# Patient Record
Sex: FEMALE | Race: ASIAN | NOT HISPANIC OR LATINO | Employment: UNEMPLOYED | URBAN - METROPOLITAN AREA
[De-identification: names, ages, dates, MRNs, and addresses within clinical notes are randomized per-mention and may not be internally consistent; named-entity substitution may affect disease eponyms.]

---

## 2017-02-07 ENCOUNTER — COMMUNICATION - HEALTHEAST (OUTPATIENT)
Dept: ENDOCRINOLOGY | Facility: CLINIC | Age: 32
End: 2017-02-07

## 2017-02-07 DIAGNOSIS — E03.9 HYPOTHYROID: ICD-10-CM

## 2017-02-10 ENCOUNTER — AMBULATORY - HEALTHEAST (OUTPATIENT)
Dept: LAB | Facility: CLINIC | Age: 32
End: 2017-02-10

## 2017-02-10 DIAGNOSIS — E03.9 HYPOTHYROID: ICD-10-CM

## 2017-02-16 ENCOUNTER — OFFICE VISIT - HEALTHEAST (OUTPATIENT)
Dept: ENDOCRINOLOGY | Facility: CLINIC | Age: 32
End: 2017-02-16

## 2017-02-16 DIAGNOSIS — E03.9 HYPOTHYROID: ICD-10-CM

## 2017-03-03 ENCOUNTER — HOME CARE/HOSPICE - HEALTHEAST (OUTPATIENT)
Dept: HOME HEALTH SERVICES | Facility: HOME HEALTH | Age: 32
End: 2017-03-03

## 2017-03-03 ENCOUNTER — ANESTHESIA - HEALTHEAST (OUTPATIENT)
Dept: ADMINISTRATIVE | Facility: OTHER | Age: 32
End: 2017-03-03

## 2017-12-18 ENCOUNTER — OFFICE VISIT - HEALTHEAST (OUTPATIENT)
Dept: INTERNAL MEDICINE | Facility: CLINIC | Age: 32
End: 2017-12-18

## 2017-12-18 DIAGNOSIS — E03.9 HYPOTHYROID: ICD-10-CM

## 2017-12-18 DIAGNOSIS — Z00.00 ANNUAL PHYSICAL EXAM: ICD-10-CM

## 2017-12-18 DIAGNOSIS — R53.83 FATIGUE: ICD-10-CM

## 2017-12-18 ASSESSMENT — MIFFLIN-ST. JEOR: SCORE: 1424.78

## 2017-12-19 ENCOUNTER — COMMUNICATION - HEALTHEAST (OUTPATIENT)
Dept: INTERNAL MEDICINE | Facility: CLINIC | Age: 32
End: 2017-12-19

## 2017-12-21 ENCOUNTER — COMMUNICATION - HEALTHEAST (OUTPATIENT)
Dept: INTERNAL MEDICINE | Facility: CLINIC | Age: 32
End: 2017-12-21

## 2020-01-17 ENCOUNTER — OFFICE VISIT - HEALTHEAST (OUTPATIENT)
Dept: FAMILY MEDICINE | Facility: CLINIC | Age: 35
End: 2020-01-17

## 2020-01-17 DIAGNOSIS — L84 CORN OR CALLUS: ICD-10-CM

## 2020-01-17 DIAGNOSIS — S91.209A AVULSION OF TOENAIL, INITIAL ENCOUNTER: ICD-10-CM

## 2020-01-17 DIAGNOSIS — L60.0 INGROWN TOENAIL: ICD-10-CM

## 2020-08-27 ENCOUNTER — OFFICE VISIT - HEALTHEAST (OUTPATIENT)
Dept: FAMILY MEDICINE | Facility: CLINIC | Age: 35
End: 2020-08-27

## 2020-08-27 DIAGNOSIS — M54.59 MECHANICAL LOW BACK PAIN: ICD-10-CM

## 2020-08-27 DIAGNOSIS — R25.1 TREMOR OF RIGHT HAND: ICD-10-CM

## 2020-08-27 DIAGNOSIS — Z13.29 SCREENING FOR THYROID DISORDER: ICD-10-CM

## 2020-08-27 DIAGNOSIS — R53.83 FATIGUE, UNSPECIFIED TYPE: ICD-10-CM

## 2020-08-27 DIAGNOSIS — H54.7 DECREASED VISUAL ACUITY: ICD-10-CM

## 2020-08-27 LAB
BASOPHILS # BLD AUTO: 0 THOU/UL (ref 0–0.2)
BASOPHILS NFR BLD AUTO: 1 % (ref 0–2)
EOSINOPHIL # BLD AUTO: 0.3 THOU/UL (ref 0–0.4)
EOSINOPHIL NFR BLD AUTO: 5 % (ref 0–6)
ERYTHROCYTE [DISTWIDTH] IN BLOOD BY AUTOMATED COUNT: 11.7 % (ref 11–14.5)
HBA1C MFR BLD: 5.3 %
HCT VFR BLD AUTO: 38.9 % (ref 35–47)
HGB BLD-MCNC: 13.1 G/DL (ref 12–16)
LYMPHOCYTES # BLD AUTO: 2 THOU/UL (ref 0.8–4.4)
LYMPHOCYTES NFR BLD AUTO: 40 % (ref 20–40)
MCH RBC QN AUTO: 30 PG (ref 27–34)
MCHC RBC AUTO-ENTMCNC: 33.8 G/DL (ref 32–36)
MCV RBC AUTO: 89 FL (ref 80–100)
MONOCYTES # BLD AUTO: 0.3 THOU/UL (ref 0–0.9)
MONOCYTES NFR BLD AUTO: 6 % (ref 2–10)
NEUTROPHILS # BLD AUTO: 2.5 THOU/UL (ref 2–7.7)
NEUTROPHILS NFR BLD AUTO: 48 % (ref 50–70)
PLATELET # BLD AUTO: 203 THOU/UL (ref 140–440)
PMV BLD AUTO: 7.7 FL (ref 7–10)
RBC # BLD AUTO: 4.38 MILL/UL (ref 3.8–5.4)
TSH SERPL DL<=0.005 MIU/L-ACNC: 0.43 UIU/ML (ref 0.3–5)
WBC: 5.1 THOU/UL (ref 4–11)

## 2021-05-30 VITALS — BODY MASS INDEX: 33.3 KG/M2 | WEIGHT: 194 LBS

## 2021-05-31 VITALS — HEIGHT: 63 IN | WEIGHT: 171 LBS | BODY MASS INDEX: 30.3 KG/M2

## 2021-06-04 VITALS
WEIGHT: 147 LBS | RESPIRATION RATE: 12 BRPM | DIASTOLIC BLOOD PRESSURE: 54 MMHG | BODY MASS INDEX: 25.23 KG/M2 | SYSTOLIC BLOOD PRESSURE: 92 MMHG | HEART RATE: 68 BPM

## 2021-06-04 VITALS
HEART RATE: 69 BPM | OXYGEN SATURATION: 99 % | SYSTOLIC BLOOD PRESSURE: 104 MMHG | BODY MASS INDEX: 24.99 KG/M2 | WEIGHT: 145.6 LBS | DIASTOLIC BLOOD PRESSURE: 62 MMHG

## 2021-06-05 NOTE — PROGRESS NOTES
Assessment/Plan:        1. Avulsion of toenail, initial encounter  Exam findings were discussed and patient verbally agreed to having the toenail removed.    Procedure:  Patient was positioned supine, and the/great toe with continued BUN prepped with Betadine and locally anesthetized with 1% lidocaine.  The partially avulsed nail was removed with no additional complication.  Aftercare instructions given  Follow-up PRN    2. Corn or callus  Exam findings were discussed and patient verbally agreed to having the corn trimmed.    Procedure:  In the supine position, using a #15 blade chronic trim down to its very base and enucleated.  Symptoms improves following the procedure      At the conclusion of the encounter the plan of care, disposition and all questions were answered and reviewed, and the patient acknowledged understanding and was involved in the decision making regarding the overall care plan.           Subjective:    Patient ID:   Clifford Ward is a 34 y.o. female presenting with an Icelandic  with having an partially avulsed left great toenail due to the injury as her son stepped on her toe about 3 weeks ago.  She also notes to having a hard spot on the end of her toe which causes pain and pressure.  She has has a history of a recurrent toenail maltracking usually going to one side wondering if something can be done about it.      Review of Systems  Allergy: reviewed  General : negative  A complete 5 point review of systems was obtained and is negative other than what is stated in the HPI.       The following patient's history were reviewed and updated as appropriate:   She  has a past medical history of Disease of thyroid gland and Migraine..      Outpatient Encounter Medications as of 1/17/2020   Medication Sig Dispense Refill     acetaminophen (TYLENOL ORAL) Take by mouth.       multivitamin therapeutic tablet Take 1 tablet by mouth daily.       prenatal vitamin iron-folic acid 27mg-0.8mg  (PRENATAL S) 27 mg iron- 800 mcg Tab tablet Take 1 tablet by mouth daily.       No facility-administered encounter medications on file as of 1/17/2020.          Objective:   /62 (Patient Site: Right Arm, Patient Position: Sitting, Cuff Size: Adult Regular)   Pulse 69   Wt 145 lb 9.6 oz (66 kg)   SpO2 99%   BMI 24.99 kg/m        Physical Exam  General exam: No apparent distress and well-hydrated  Left foot: Partially avulsed great toenail with no acute bruising, swelling or infection.  There is a hard corn at the distal end of the great toe.

## 2021-06-08 NOTE — PROGRESS NOTES
Progress Note    Reason for Visit:  Chief Complaint     Thyroid Problem          Progress Note:    HPI:      this patient ws  onsultation at the [facility] gynecologist because of abnormal TSH.  I do not have these results.  But according to the patient have TSH was abnormal the patient is currently 36 weeks pregnant this is her third pregnancy.  She is 71 years old.    She came to the clinic accompanied by her  according to her  and her second pregnancy 9 years agothyroid was overactive and she was treated with oral medication to control her thyroid.    Right now the patient feels fatigued and tired but it is hard to safety if it is due to the pregnancy.  She is having occasional palpitation but otherwise she's euthyroid by symptoms.    She only takes prenatal thyroid exam is normal.    Repeat thyroid function showed TSH of 0.46, free T4 1 0.0, total T3 159.    She denies family history of thyroid disorders.    Component      Latest Ref Rng 7/26/2016 2/10/2017   Sodium      136 - 145 mmol/L 137    Potassium      3.5 - 5.0 mmol/L 4.0    CO2      22 - 31 mmol/L 23    Chloride      98 - 107 mmol/L 105    Anion Gap, Calculation      5 - 18 mmol/L 9    BUN      8 - 22 mg/dL 8    Creatinine      0.60 - 1.10 mg/dL 0.62    GFR MDRD Non Af Amer      >60 mL/min/1.73m2 >60    GFR MDRD Af Amer      >60 mL/min/1.73m2 >60    Glucose      70 - 125 mg/dL 76    Calcium      8.5 - 10.5 mg/dL 9.1    TSH      0.30 - 5.00 uIU/mL  0.46   Free T4      0.7 - 1.8 ng/dL  1.0   T3, Total      45 - 175 ng/dL  159         Review of Systems:    Nervous System: No headache, dizziness, fainting or memory loss. No tingling sensation of hand or feet.  Ears: No hearing loss or ringing in the ears  Eyes: No blurring of vision, redness, itching or dryness.  Nose: No nosebleed or loss of smell  Mouth: No mouth sores or loss of taste  Throat: No hoarseness or difficulty swallowing  Neck: No enlarged thyroid or lymph nodes.  Heart: No  chest pain, palpitation or irregular heartbeat. No swelling of hands or feet  Lungs: No shortness of breath, cough, night sweats, wheezing or hemoptysis.  Gastrointestinal: No nausea or vomiting, constipation or diarrhea.  No acid reflux, abdominal pain or blood in stools.  Kidney/Bladdr: No polyuria, polydipsia, nocturia or hematuria.  Genital/Sexual: No loss of libido  Skin: No rash, hair loss or hirsutism.  No abnormal striae  Muscles/Joints/Bones: No morning stiffness, muscle aches and pain or loss of height.    Current Medications:  Current Outpatient Prescriptions   Medication Sig     nitrofurantoin, macrocrystal-monohydrate, (MACROBID) 100 MG capsule Take 100 mg by mouth 2 (two) times a day.     prenatal vitamin iron-folic acid 27mg-0.8mg (PRENATAL S) 27 mg iron- 800 mcg Tab tablet Take 1 tablet by mouth daily.     pyridoxine, vitamin B6, (VITAMIN B-6) 50 MG tablet Take 50 mg by mouth daily.       Patients Active Problems:  Patient Active Problem List   Diagnosis     Tinea Versicolor     Adjustment Disorder With Depressed Mood     Pharyngitis     Headache     Impetigo     Hypothyroid       History:   reports that she has never smoked. She has never used smokeless tobacco. She reports that she does not drink alcohol or use illicit drugs.   reports that she has never smoked. She has never used smokeless tobacco. She reports that she does not drink alcohol or use illicit drugs.  History   Smoking Status     Never Smoker   Smokeless Tobacco     Never Used      reports that she has never smoked. She has never used smokeless tobacco. She reports that she does not drink alcohol or use illicit drugs.  History   Sexual Activity     Sexual activity: Yes     Partners: Male     Past Medical History:   Diagnosis Date     Disease of thyroid gland      Migraine      Family History   Problem Relation Age of Onset     Diabetes Mother      Hypertension Mother      Past Medical History:   Diagnosis Date     Disease of thyroid  gland      Migraine      No past surgical history on file.    Vitals   weight is 194 lb (88 kg). Her blood pressure is 126/74 and her pulse is 76.         Exam  General appearance: The patient looked well, not in acute distress.  Eyes: no evidence of thyroid eye disease.   Retinal exam: No evidence of diabetic retinopathy.  Mouth and Throat: Normal  Neck: No evidence of thyromegaly, enlarged lymph node or tenderness  Chest: Trachea is central. Chest is clear to auscultation and percussion. Breat sounds are normal.  Cardiovascular exam: JVP is not raised. Heart sounds are normal, no murmurs or rub  Peripheral pulses are palpable.   Abdomen: No masses or tenderness.    Back: No vertebral tenderness or kyphosis.  Extremities: No evidence of leg edema.   Skin: Normal to touch.  No abnormal striae  Neurologic exam:  Visual fields are intact by confrontation, grossly intact. No evidence of peripheral neuropathy.  Detailed foot exam normal.        Diagnosis:  No diagnosis found.    Orders:   No orders of the defined types were placed in this encounter.        Assessment and Plan:  Hyperthyroidism due to pregnancy I discussed about the vicinity of the disease was a patient.  I told about this is normal response due to the similarity of the structure between dialysis of unit of TSH and hCG.    Her thyroid function test is normal masses there is no need for her to take any medication.    We will check a thyroid function test again in 3 months.    Blood pressure 126/74 pulse 74.  I have not given to patient any further appointments.  But I would be happy to see her infant in the future if her thyroid function became abnormal.    I did spent 40 minutes with the patient more than 50% was spent on counseling and managing education.

## 2021-06-09 NOTE — ANESTHESIA POSTPROCEDURE EVALUATION
"Patient: Clifford Ward  * No procedures listed *  Anesthesia type: regional    Visit Vitals     /63 (Patient Position: Sitting)     Pulse (!) 102     Temp 36.6  C (97.8  F) (Oral)     Resp 16     Ht 5' 3\" (1.6 m)     Wt 197 lb (89.4 kg)     LMP 06/03/2016     SpO2 97%     Breastfeeding Unknown     BMI 34.9 kg/m2     CNS normal. No post dural puncture headache. No noted or reported complications of labor epidural.  "

## 2021-06-09 NOTE — ANESTHESIA PREPROCEDURE EVALUATION
Anesthesia Evaluation      Patient summary reviewed   No history of anesthetic complications     Airway   Mallampati: III  Neck ROM: full   Pulmonary - negative ROS and normal exam    breath sounds clear to auscultation                         Cardiovascular - negative ROS and normal exam  Exercise tolerance: good  Rhythm: regular  Rate: normal,         Neuro/Psych - negative ROS     Endo/Other    (+) hypothyroidism, obesity, pregnant     GI/Hepatic/Renal - negative ROS      Other findings: Gravid. Denies PIH, GDM or Preeclampsia.          Dental    (+) poor dentition and chipped                       Anesthesia Plan  Planned anesthetic: epidural  Labor epidural risks and benefits discussed with patient.  All questions answered.  Consent signed.  Patient wishes to proceed.  RN present.    ASA 2     Anesthetic plan and risks discussed with: patient and spouse  Anesthesia plan special considerations: increased risk of difficult airway,   Post-op plan: routine recovery

## 2021-06-09 NOTE — ANESTHESIA PROCEDURE NOTES
Epidural Block    Patient location during procedure: OB  Time Called: 3/3/2017 3:00 AM  Reason for Block:at surgeon's request and labor epidural  Staffing:  Performing  Anesthesiologist: NADIA BARRAZA  Preanesthetic Checklist  Completed: patient identified, risks, benefits, and alternatives discussed, timeout performed, consent obtained, at patient's request, airway assessed, oxygen available, suction available, emergency drugs available and hand hygiene performed  Procedure  Patient position: sitting  Prep: ChloraPrep  Patient monitoring: continuous pulse oximetry, heart rate and blood pressure  Approach: midline  Location: L2-L3  Injection technique: DARLYN air  Number of Attempts:1  Needle  Needle type: Tuohy   Needle gauge: 17 G     Catheter in Space: 5  Assessment  Sensory level: T10  No complications      Additional Notes:  No CSF.  No Heme.  Infusion connected after test dose negative.  No issues.  Patient tolerated well. Pump reviewed and started.  Patients vital signs stable.  No LAST.  RN was in room the whole time.

## 2021-06-10 NOTE — PROGRESS NOTES
Subjective     Clifford Ward is a 35 y.o. female who presents to clinic today for the following health issues:  Please note that today's visit is conducted with the assistance of an Setswana  via phone.     HPI   Vision difficulty - pt had recently been screened for vision at a driving test.  She did not pass, due to poor acuity and inability to recognize color yellow. She has not previously been told that she was color-blind.  She has not noticed a decrease in her visual acuity recently.  She has not had formal vision screening in the past.      Hx of gestational hypothyroidism with pregnancy in 2017. Did not require treatment during pregnancy. Has not had thyroid levels checked since that time.  Has noted increased fatigue and weight gain over the last several months. No changes in mood or bowel habits.     Low back pain intermittently -for the last few months, patient has noted episodes of low back pain lasting a couple of days before resolving without specific intervention.  She takes tylenol for pain  with significant relief.  Describes pain across the low back, but not with radiation into the legs.  She finds that it does not limit her activities. She notes no associated urinary symptoms. No injury. She is not regularly exercising/stretching.  Occasionally will note unrelated numbness and tingling in the legs/feet, though this is infrequent and does not limit her activity.     Right Hand tremor - right hand tremor noted intermittently, seems to be worsening, though patient does not notice it on a daily basis.  She is not sure of anything that seems to make it better or worse.  It currently is not limiting her activities.  She has noticed it most often when carrying a full glass of tea, when she will sometimes spill some of this due to the tremor.  She denies any associated hand weakness, numbness, or tingling.  She is not aware of any family history of similar tremors.  She has not appreciated  the tremor to be present when hand is at rest..      Patient Active Problem List   Diagnosis     Tinea Versicolor     Adjustment Disorder With Depressed Mood     Pharyngitis     Headache     Impetigo     Hypothyroid     History reviewed. No pertinent surgical history.    Social History     Tobacco Use     Smoking status: Never Smoker     Smokeless tobacco: Never Used   Substance Use Topics     Alcohol use: No     Family History   Problem Relation Age of Onset     Diabetes Mother      Hypertension Mother      Breast cancer Paternal Aunt 40         Current Outpatient Medications   Medication Sig Dispense Refill     acetaminophen (TYLENOL ORAL) Take by mouth.       aspirin/acetaminophen/caffeine (EXCEDRIN MIGRAINE ORAL) Take by mouth.       naphazoline/zinc sulf/glycerin (CLEAR EYES ITCHY EYE RELIEF OPHT) Apply to eye.       No current facility-administered medications for this visit.      No Known Allergies    Review of Systems   Negative except as noted above in the HPI.      Objective    BP 92/54   Pulse 68   Resp 12   Wt 147 lb (66.7 kg)   BMI 25.23 kg/m    Body mass index is 25.23 kg/m .  Physical Exam   Physical Examination: General appearance - alert, well appearing, and in no distress  Mental status - normal mood, behavior, speech, dress, motor activity, and thought processes  Eyes - PERRL, conjunctiva normal bilaterally.  Neck - supple, no significant adenopathy, thyroid exam: thyroid is normal in size without nodules or tenderness  Chest - clear to auscultation, no wheezes, rales or rhonchi, symmetric air entry  Heart - normal rate, regular rhythm, normal S1, S2, no murmurs, rubs, clicks or gallops  Back exam - pain at left low back with flexion beyond 60 degrees at L-spine, no pain noted with twisting, extension or lateral movement at L-spine.  Mild tenderness noted at the left lumbar paraspinal muscles, no vertebral tenderness noted, positive straight-leg raise on the left at 90 degrees, normal reflexes  and strength bilateral lower extremities, sensory exam intact bilateral lower extremities  Neurological - alert, oriented, normal speech, no focal findings or movement disorder noted, DTR's normal and symmetric in both the upper and lower extremities, motor and sensory grossly normal bilaterally, normal muscle tone, no hand tremors noted with either rest nor activity during today's exam, strength 5/5 in the upper and lower extremities bilaterally.  Extremities -no lower extremity swelling noted on today's exam.        Assessment & Plan     Problem List Items Addressed This Visit     None      Visit Diagnoses     Decreased visual acuity    -  Primary    Relevant Orders    Glycosylated Hemoglobin A1c (Completed)    Ambulatory referral to Optometry    Screening for thyroid disorder        Relevant Orders    Thyroid Stimulating Hormone (TSH) (Completed)    Fatigue, unspecified type        Relevant Orders    Thyroid Stimulating Hormone (TSH) (Completed)    HM1(CBC and Differential) (Completed)    HM1 (CBC with Diff) (Completed)    Tremor of right hand        Mechanical low back pain            Patient instructions:   Please schedule a formal vision exam with optometry.  One of our referral specialists should be phoning you within the next 2 days to assist in scheduling this visit.  I would recommend screening blood tests for type 2 diabetes, thyroid function, and complete blood counts to further evaluate the fatigue, occasional numbness and tingling that you experience in your legs as well as the presence of intermittent hand tremor.  For your chronic low back pain I would recommend that you initially attempt to go for walks daily (ideally of at least 20-30 minutes duration-once or twice daily) and perform low back stretching/strengthening exercises as noted below.  If your symptoms do not seem to be improving with these measures within the next 4-6 weeks, please contact me for consideration of a referral for formal  "physical therapy.  Please also contact me if your symptoms seem to be worsening or if you should experience new associated symptoms, as well.  The intermittent activity-related hand tremor that you described at today's visit, is consistent with a benign (essential) tremor.  At this point it does not sound to be severe enough to require specific treatment (medicine).  If it should be noted to occur at rest or if it should worsen significantly and begin to affect your ability to perform tasks, I would recommend a follow-up visit to discuss treatment options further.    BMI:   Estimated body mass index is 25.23 kg/m  as calculated from the following:    Height as of 9/16/19: 5' 4\" (1.626 m).    Weight as of this encounter: 147 lb (66.7 kg).     Patient is advised to return to clinic for reassessment if noting lack of response to recommended treatments, or if noting any new or worsening symptoms.      Jermaine Woodruff MD  [facility]      "

## 2021-06-14 NOTE — PROGRESS NOTES
Assessment/Plan:     1. Annual physical exam  2. Hypothyroid  3. Fatigue  - Thyroid Stimulating Hormone (TSH)  - T3, Total  - T4, Free  - Reviewed the importance of monitoring for changes in breast appearance such as nipple inversion, changes in breast tissue texture, non-healing wounds, or nipple discharge   - The following high BMI interventions were performed this visit: encouragement to exercise  - Follow up if feeling increasingly down/depressed       Subjective:     Clifford Ward is a 32 y.o. female who presents for an annual exam. She speaks some English, her  translates for her when necessary. She is currently breastfeeding. Denies use of contraception and is using abstinence for contraception and declines an alternative at this time.     During 2nd pregnancy 8 years ago she had thyroid issues. She had symptoms of fatigue, low activity level, and weight gain. Her  recalls that she was taking medication for this but is unsure of the name of the medication. According to a recent endocrinology note she had hyperthyroidism treated with oral medication.     H/o depression in the past treated 8 years ago when she was having treatment for her thyroid. She has occasional feelings of depression but declines additional treatment in regards to this. She attributes these feelings to being tired and having 3 children (ages 8 months, 8 years and 10 years)     The patient reports that there is not domestic violence in her life.     Healthy Habits:   Regular Exercise: No  Healthy Diet: Yes  Dental Visits Regularly: Yes  Sexually active: Yes      Immunization History   Administered Date(s) Administered     Tdap 2009, 2016     Immunization status: UTD, she declines an influenza vaccine     Gynecologic History  No LMP recorded.  Contraception: none  Last Pap: 6/15/2015 Results were: normal HPV testing: not done       OB History    Para Term  AB Living   4 3 3  1 3   SAB TAB  Ectopic Multiple Live Births   1   0 3      # Outcome Date GA Lbr [place]/2nd Weight Sex Delivery Anes PTL Lv   4 Term 03/03/17 39w0d 06:45 / 01:24 10 lb 9 oz (4.791 kg) M Vag-Spont EPI N FILEMON   3 SAB 12/17/15           2 Term 03/29/09    M    FILEMON   1 Term 04/10/07    M    FILEMON          Current Outpatient Prescriptions   Medication Sig Dispense Refill     multivitamin therapeutic tablet Take 1 tablet by mouth daily.       prenatal vitamin iron-folic acid 27mg-0.8mg (PRENATAL S) 27 mg iron- 800 mcg Tab tablet Take 1 tablet by mouth daily.       No current facility-administered medications for this visit.      Past Medical History:   Diagnosis Date     Disease of thyroid gland      Migraine      History reviewed. No pertinent surgical history.  Review of patient's allergies indicates no known allergies.  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     Social History Main Topics     Smoking status: Never Smoker     Smokeless tobacco: Never Used     Alcohol use No     Drug use: No     Sexual activity: Yes     Partners: Male     Other Topics Concern     Not on file     Social History Narrative       Review of Systems  General:  Negative except as noted above  Eyes: Negative except as noted above  Ears/Nose/Throat: Negative except as noted above  Cardiovascular: Negative except as noted above  Respiratory:  Negative except as noted above  Gastrointestinal:  Negative except as noted above  Musculoskeletal:  Negative except as noted above  Skin: Negative except as noted above  Neurologic: Negative except as noted above  Psychiatric: Negative except as noted above  Endocrine: Negative except as noted above  Heme/Lymphatic: Negative except as noted above  "  Allergic/Immunologic: Negative except as noted above      Objective:      Vitals:    12/18/17 1553   BP: 100/64   Pulse: (!) 58   Weight: 171 lb (77.6 kg)   Height: 5' 3\" (1.6 m)     Wt Readings from Last 3 Encounters:   12/18/17 171 lb (77.6 kg)   03/02/17 197 lb (89.4 kg)   02/16/17 194 lb (88 kg)     Body mass index is 30.29 kg/(m^2).     Physical Exam:  General Appearance: Alert, cooperative, no distress.  Head: Normocephalic, without obvious abnormality, atraumatic  Eyes: PERRL, conjunctiva/corneas clear, EOM's intact  Ears: Normal TM's and external ear canals, both ears  Throat: Lips, mucosa, and tongue normal  Neck: Supple, symmetrical, trachea midline, no adenopathy;  thyroid: not enlarged, symmetric, no tenderness/mass/nodules  Back: Symmetric, no curvature, ROM normal, no CVA tenderness  Lungs: Clear to auscultation bilaterally, respirations unlabored  Breasts: No breast masses, tenderness, asymmetry, or nipple discharge. Reviewed SBE   Heart: Regular rate and rhythm, S1 and S2 normal, no murmur, rub, or gallop  Abdomen: Soft, non-tender, bowel sounds active all four quadrants,  no masses, no organomegaly  Extremities: Extremities normal, atraumatic, no cyanosis or edema  Skin: Skin color, texture, turgor normal, no rashes or lesions  Lymph nodes: Cervical, supraclavicular, and axillary nodes normal  Neurologic: Normal  Psych: Normal affect.  Does not appear anxious or depressed.         "

## 2021-06-15 PROBLEM — E03.9 HYPOTHYROID: Status: ACTIVE | Noted: 2017-02-09

## 2021-06-18 NOTE — PATIENT INSTRUCTIONS - HE
Patient Instructions by Jermaine Woodruff MD at 8/27/2020  1:00 PM     Author: Jermaine Woodruff MD Service: -- Author Type: Physician    Filed: 8/30/2020  1:59 PM Encounter Date: 8/27/2020 Status: Addendum    : Jermaine Woodruff MD (Physician)    Related Notes: Original Note by Jermaine Woodruff MD (Physician) filed at 8/30/2020  1:53 PM       Please schedule a formal vision exam with optometry.  One of our referral specialists should be phoning you within the next 2 days to assist in scheduling this visit.  I would recommend screening blood tests for type 2 diabetes, thyroid function, and complete blood counts to further evaluate the occasional numbness and tingling that you experience in your legs as well as the presence of intermittent hand tremor.  For your chronic low back pain I would recommend that you initially attempt to go for walks daily (ideally of at least 20-30 minutes duration-once or twice daily) and perform low back stretching/strengthening exercises as noted below.  If your symptoms do not seem to be improving with these measures within the next 4-6 weeks, please contact me for consideration of a referral for formal physical therapy.  Please also contact me if your symptoms seem to be worsening or if you should experience new associated symptoms, as well.  The intermittent activity-related hand tremor that you described at today's visit, is consistent with a benign (essential) tremor.  At this point it does not sound to be severe enough to require specific treatment (medicine).  If it should be noted to occur at rest or if it should worsen significantly and begin to affect your ability to perform tasks, I would recommend a follow-up visit to discuss treatment options further.  Exercises to Strengthen Your Lower Back  Strong lower back and abdominal muscles work together to support your spine. The exercises below will help strengthen the lower back. It is important that you begin  exercising slowly and increase levels gradually.  Always begin any exercise program with stretching. If you feel pain while doing any of these exercises, stop and talk to your doctor about a more specific exercise program that better suits your condition.   Low back stretch  The point of stretching is to make you more flexible and increase your range of motion. Stretch only as much as you are able. Stretch slowly. Do not push your stretch to the limit. If at any point you feel pain while stretching, this is your (temporary) limit.    Lie on your back with your knees bent and both feet on the ground.    Slowly raise your left knee to your chest as you flatten your lower back against the floor. Hold for 5 seconds.    Relax and repeat the exercise with your right knee.    Do 10 of these exercises for each leg.    Repeat hugging both knees to your chest at the same time.  Building lower back strength  Start your exercise routine with 10 to 30 minutes a day, 1 to 3 times a day.  Initial exercises  Lying on your back:  1. Ankle pumps: Move your foot up and down, towards your head, and then away. Repeat 10 times with each foot.  2. Heel slides: Slowly bend your knee, drawing the heel of your foot towards you. Then slide your heel/foot from you, straightening your knee. Do not lift your foot off the floor (this is not a leg lift).  3. Abdominal contraction: Bend your knees and put your hands on your stomach. Tighten your stomach muscles. Hold for 5 seconds, then relax. Repeat 10 times.  4. Straight leg raise: Bend one leg at the knee and keep the other leg straight. Tighten your stomach muscles. Slowly lift your straight leg 6 to 12 inches off the floor and hold for up to 5 seconds. Repeat 10 times on each side.  Standin. Wall squats: Stand with your back against the wall. Move your feet about 12 inches away from the wall. Tighten your stomach muscles, and slowly bend your knees until they are at about a 45 degree  angle. Do not go down too far. Hold about 5 seconds. Then slowly return to your starting position. Repeat 10 times.  2. Heel raises: Stand facing the wall. Slowly raise the heels of your feet up and down, while keeping your toes on the floor. If you have trouble balancing, you can touch the wall with your hands. Repeat 10 times.  More advanced exercises  When you feel comfortable enough, try these exercises.  1. Kneeling lumbar extension: Begin on your hands and knees. At the same time, raise and straighten your right arm and left leg until they are parallel to the ground. Hold for 2 seconds and come back slowly to a starting position. Repeat with left arm and right leg, alternating 10 times.  2. Prone lumbar extension: Lie face down, arms extended overhead, palms on the floor. At the same time, raise your right arm and left leg as high as comfortably possible. Hold for 10 seconds and slowly return to start. Repeat with left arm and right leg, alternating 10 times. Gradually build up to 20 times. (Advanced: Repeat this exercise raising both arms and both legs a few inches off the floor at the same time. Hold for 5 seconds and release.)  3. Pelvic tilt: Lie on the floor on your back with your knees bent at 90 degrees. Your feet should be flat on the floor. Inhale, exhale, then slowly contract your abdominal muscles bringing your navel toward your spine. Let your pelvis rock back until your lower back is flat on the floor. Hold for 10 seconds while breathing smoothly.  4. Abdominal crunch: Perform a pelvic tilt (above) flattening your lower back against the floor. Holding the tension in your abdominal muscles, take another breath and raise your shoulder blades off the ground (this is not a full sit-up). Keep your head in line with your body (dont bend your neck forward). Hold for 2 seconds, then slowly lower.  Date Last Reviewed: 6/1/2016 2000-2017 [employer]. [address] [facility], [place], [place]  [facility]. All rights reserved. This information is not intended as a substitute for professional medical care. Always follow your healthcare professional's instructions.           Back Exercises: Back Release  Do this exercise on your hands and knees. Keep your knees under your hips and your hands under your shoulders.        Relax your abdominal and buttocks muscles, lift your head, and let your back sag. Be sure to keep your weight evenly distributed. Dont sit back on your hips.     Hold for 5 seconds.    Return to starting position.    Tuck your head and lift (arch) your back.    Hold for 5 seconds    Return to starting position.    Repeat 5 times.  Date Last Reviewed: 3/1/2018    3579-2309 [employer]. [address] [place] [address]. All rights reserved. This information is not intended as a substitute for professional medical care. Always follow your healthcare professional's instructions.           Lumbar Extension (Flexibility)    1. Lie face down on your belly, forehead on the floor. You can lie on a mat or towel.  2. Bend your arms next to your body and lift your upper body up onto your forearms. Your palms and forearms should be flat on the floor. Keep your belly and hips on the floor.  3. Hold your upper body up with your forearms for 20 seconds. Then slowly lower back down to the floor.  4. Repeat 2 times, or as instructed.  Date Last Reviewed: 3/10/2016    0379-6002 [employer]. [address] [place] [address]. All rights reserved. This information is not intended as a substitute for professional medical care. Always follow your healthcare professional's instructions.           Self-Care for Low Back Pain    Most people have low back pain now and then. In many cases, it isnt serious and self-care can help. Sometimes low back pain can be a sign of a bigger problem. Call your healthcare provider if your pain returns often or gets worse over time. For the  long-term care of your back, get regular exercise, lose any excess weight and learn good posture.  Take a short rest  Lying down during the day may be beneficial for short periods of time if severe pain increases with sitting or standing. Long-term bed rest could be detrimental.  Reduce pain and swelling  Cold reduces swelling. Both cold and heat can reduce pain. Protect your skin by placing a towel between your body and the ice or heat source.    For the first few days, apply an ice pack for 15 to 20 minutes .    After the first few days, try heat for 15 minutes at a time to ease pain. Never sleep on a heating pad.    Over-the-counter medicine can help control pain and swelling. Try aspirin or ibuprofen.  Exercise  Exercise can help your back heal. It also helps your back get stronger and more flexible, preventing any reinjury. Ask your healthcare provider about specific exercises for your back.      Use good posture to avoid reinjury    When moving, bend at the hips and knees. Dont bend at the waist or twist around.    When lifting, keep the object close to your body. Dont try to lift more than you can handle.    When sitting, keep your lower back supported. Use a rolled-up towel as needed.  Seek immediate medical care if:    Youre unable to stand or walk.    You have a temperature over 100.4 F (38.0 C)    You have frequent, painful, or bloody urination.    You have severe abdominal pain.    You have a sharp, stabbing pain.    Your pain is constant.    You have pain or numbness in your leg.    You feel pain in a new area of your back.    You notice that the pain isnt decreasing after more than a week.   Date Last Reviewed: 9/29/2015 2000-2017 [employer]. [address] [place] [address]. All rights reserved. This information is not intended as a substitute for professional medical care. Always follow your healthcare professional's instructions.

## 2021-06-20 ENCOUNTER — HEALTH MAINTENANCE LETTER (OUTPATIENT)
Age: 36
End: 2021-06-20

## 2021-07-14 PROBLEM — Z34.90 PREGNANT: Status: RESOLVED | Noted: 2017-03-02 | Resolved: 2017-03-03

## 2021-10-11 ENCOUNTER — HEALTH MAINTENANCE LETTER (OUTPATIENT)
Age: 36
End: 2021-10-11

## 2021-12-14 ENCOUNTER — OFFICE VISIT (OUTPATIENT)
Dept: FAMILY MEDICINE | Facility: CLINIC | Age: 36
End: 2021-12-14
Payer: COMMERCIAL

## 2021-12-14 VITALS
DIASTOLIC BLOOD PRESSURE: 67 MMHG | HEART RATE: 86 BPM | WEIGHT: 160.6 LBS | OXYGEN SATURATION: 98 % | BODY MASS INDEX: 27.57 KG/M2 | TEMPERATURE: 99.2 F | SYSTOLIC BLOOD PRESSURE: 100 MMHG

## 2021-12-14 DIAGNOSIS — Z20.822 SUSPECTED 2019 NOVEL CORONAVIRUS INFECTION: Primary | ICD-10-CM

## 2021-12-14 DIAGNOSIS — S46.812A TRAPEZIUS STRAIN, LEFT, INITIAL ENCOUNTER: ICD-10-CM

## 2021-12-14 PROCEDURE — U0005 INFEC AGEN DETEC AMPLI PROBE: HCPCS | Performed by: PHYSICIAN ASSISTANT

## 2021-12-14 PROCEDURE — U0003 INFECTIOUS AGENT DETECTION BY NUCLEIC ACID (DNA OR RNA); SEVERE ACUTE RESPIRATORY SYNDROME CORONAVIRUS 2 (SARS-COV-2) (CORONAVIRUS DISEASE [COVID-19]), AMPLIFIED PROBE TECHNIQUE, MAKING USE OF HIGH THROUGHPUT TECHNOLOGIES AS DESCRIBED BY CMS-2020-01-R: HCPCS | Performed by: PHYSICIAN ASSISTANT

## 2021-12-14 PROCEDURE — 99214 OFFICE O/P EST MOD 30 MIN: CPT | Performed by: PHYSICIAN ASSISTANT

## 2021-12-14 NOTE — LETTER
December 15, 2021      Clifford Ward  [address] [place] [address]        Dear ,    We are writing to inform you of your test results.    {results letter list:[id number]}    Resulted Orders   Symptomatic; Yes COVID-19 Virus (Coronavirus) by PCR Nose   Result Value Ref Range    SARS CoV2 PCR Positive (A) Negative, Testing sent to reference lab. Results will be returned via unsolicited result      Comment:      POSITIVE: SARS-CoV-2 (COVID-19) RNA detected, presumed positive.    Narrative    Testing was performed using the [employer] SARS-CoV-2 Assay on the  [employer] Instrument System. Additional information about this  Emergency Use Authorization (EUA) assay can be found via the Lab  Guide. This test should be ordered for the detection of SARS-CoV-2 in  individuals who meet SARS-CoV-2 clinical and/or epidemiological  criteria. Test performance is unknown in asymptomatic patients. This  test is for in vitro diagnostic use under the FDA EUA for  laboratories certified under CLIA to perform high complexity testing.  This test has not been FDA cleared or approved. A negative result  does not rule out the presence of PCR inhibitors in the specimen or  target RNA in concentration below the limit of detection for the  assay. The possibility of a false negative should be considered if  the patient's recent exposure or clinical presentation suggests  COVID-19. This test was validated by the [facility]. This laboratory is certified under  the Clinical Laboratory Improvement Amendments of 1988 (CLIA-88) as  qualified to perform high complexity laboratory testing.       If you have any questions or concerns, please call the clinic at the number listed above.       Sincerely,      Suni Raymond PA-C

## 2021-12-15 ENCOUNTER — TELEPHONE (OUTPATIENT)
Dept: FAMILY MEDICINE | Facility: CLINIC | Age: 36
End: 2021-12-15
Payer: COMMERCIAL

## 2021-12-15 LAB — SARS-COV-2 RNA RESP QL NAA+PROBE: POSITIVE

## 2021-12-15 NOTE — PATIENT INSTRUCTIONS
Viral URI:  Patient was educated on the natural course of viral illness. High suspicion for COVID as spouse tested positive. COVID PCR is pending.  Conservative measures discussed including increased fluids, nasal saline irrigation (neti pot), warm steamy shower, salt water gargles, cough suppressants, expectorants (Mucinex), and analgesics (Tylenol and/or Ibuprofen). See your primary care provider if symptoms worsen or do not improve in 7 days. Seek emergency care if you develop fever over 104 or shortness of breath.     Left trapezius strain:  Patient was educated on the natural course of muscular strain. Conservative measures discussed including rest, heat, massage, and over-the-counter analgesics (Tylenol or Ibuprofen) as needed. See your primary care provider if symptoms worsen or do not improve in 7 days. Seek emergency care if you develop severe pain/swelling, inability to move extremity, skin paleness, or weakness.

## 2021-12-15 NOTE — PROGRESS NOTES
URGENT CARE VISIT:    SUBJECTIVE:   Clifford Ward is a 36 year old female presenting with a chief complaint of stuffy nose, cough - non-productive, headache, and sore throat. Onset was 2 day(s) ago. She denies the following symptoms: shortness of breath, vomiting and diarrhea. Course of illness is same. Treatment measures tried include Tylenol with no relief of symptoms. Predisposing factors include spouse tested positive for COVID.    She is also here for left shoulder and back pain since yesterday. Pain is sharp and intermittent. Pain worsens with movement and improves with rest. Tried Tylenol with no relief of symptoms. Symptoms are stable. No known injury.    PMH:   Past Medical History:   Diagnosis Date     Disease of thyroid gland      Migraine      Allergies: Patient has no known allergies.   Medications:   Current Outpatient Medications   Medication Sig Dispense Refill     acetaminophen (TYLENOL ORAL) [ACETAMINOPHEN (TYLENOL ORAL)] Take by mouth.       aspirin/acetaminophen/caffeine (EXCEDRIN MIGRAINE ORAL) [ASPIRIN/ACETAMINOPHEN/CAFFEINE (EXCEDRIN MIGRAINE ORAL)] Take by mouth.       Social History:   Social History     Tobacco Use     Smoking status: Never Smoker     Smokeless tobacco: Never Used   Substance Use Topics     Alcohol use: No       ROS:  General: negative  Skin: negative  Eyes: negative  Ears/Nose/Throat: nasal congestion  Respiratory: Cough  Cardiovascular: negative  Gastrointestinal: negative  Genitourinary: negative  Musculoskeletal: back pain and left shoulder pain  Neurologic: negative      OBJECTIVE:  /67 (BP Location: Right arm, Patient Position: Sitting, Cuff Size: Adult Regular)   Pulse 86   Temp 99.2  F (37.3  C) (Tympanic)   Wt 72.8 kg (160 lb 9.6 oz)   SpO2 98%   BMI 27.57 kg/m    GENERAL APPEARANCE: healthy, alert and no distress  EYES: EOMI,  PERRL, conjunctiva clear  HENT: ear canals and TM's normal.  Nose and mouth without ulcers, erythema or lesions  NECK:  supple, nontender, no lymphadenopathy  RESP: lungs clear to auscultation - no rales, rhonchi or wheezes  CV: regular rates and rhythm, normal S1 S2, no murmur noted  MS: moderate left trapezius TTP and over shoulder blade. FROM. Pain with shoulder abduction. No spinal TTP.  NEURO: Normal strength and tone, sensory exam grossly normal,  normal speech and mentation  SKIN: no suspicious lesions or rashes      ASSESSMENT:    ICD-10-CM    1. Suspected 2019 novel coronavirus infection  Z20.822 Symptomatic; Yes COVID-19 Virus (Coronavirus) by PCR     Symptomatic; Yes COVID-19 Virus (Coronavirus) by PCR Nose   2. Trapezius strain, left, initial encounter  S46.812A        PLAN:  Patient Instructions   Viral URI:  Patient was educated on the natural course of viral illness. High suspicion for COVID as spouse tested positive. COVID PCR is pending.  Conservative measures discussed including increased fluids, nasal saline irrigation (neti pot), warm steamy shower, salt water gargles, cough suppressants, expectorants (Mucinex), and analgesics (Tylenol and/or Ibuprofen). See your primary care provider if symptoms worsen or do not improve in 7 days. Seek emergency care if you develop fever over 104 or shortness of breath.     Left trapezius strain:  Patient was educated on the natural course of muscular strain. Conservative measures discussed including rest, heat, massage, and over-the-counter analgesics (Tylenol or Ibuprofen) as needed. See your primary care provider if symptoms worsen or do not improve in 7 days. Seek emergency care if you develop severe pain/swelling, inability to move extremity, skin paleness, or weakness.     Patient verbalized understanding and is agreeable to plan. The patient was discharged ambulatory and in stable condition.    Suni Raymond PA-C ....................  12/14/2021   6:41 PM

## 2021-12-16 NOTE — TELEPHONE ENCOUNTER
"-Coronavirus (COVID-19) Notification    Caller Name (Patient, parent, daughter/son, grandparent, etc)  Patient    Reason for call  Notify of Positive Coronavirus (COVID-19) lab results, assess symptoms,  review  [facility] recommendations    Lab Result    Lab test:  2019-nCoV rRt-PCR or SARS-CoV-2 PCR    Oropharyngeal AND/OR nasopharyngeal swabs is POSITIVE for 2019-nCoV RNA/SARS-COV-2 PCR (COVID-19 virus)    RN Recommendations/Instructions per [facility] Coronavirus COVID-19 recommendations    Brief introduction script  Introduce self then review script:  \"I am calling on behalf of [employer].  We were notified that your Coronavirus test (COVID-19) for was POSITIVE for the virus.  I have some information to relay to you but first I wanted to mention that the [place] Dept of Health will be contacting you shortly [it's possible MD already called Patient] to talk to you more about how you are feeling and other people you have had contact with who might now also have the virus.  Also,  [facility] is Partnering with the [facility] for Covid-19 research, you may be contacted directly by research staff.\"    Assessment (Inquire about Patient's current symptoms)   Assessment   Current Symptoms at time of phone call: (if no symptoms, document No symptoms] Runny nose,   Symptoms onset (if applicable) 12/11/2021     If at time of call, Patients symptoms hare worsened, the Patient should contact 911 or have someone drive them to Emergency Dept promptly:      If Patient calling 911, inform 911 personal that you have tested positive for the Coronavirus (COVID-19).  Place mask on and await 911 to arrive.    If Emergency Dept, If possible, please have another adult drive you to the Emergency Dept but you need to wear mask when in contact with other people.      Monoclonal Antibody Administration    You may be eligible to receive a new treatment with a monoclonal antibody for preventing hospitalization in " "patients at high risk for complications from COVID-19.   This medication is still experimental and available on a limited basis; it is given through an IV and must be given at an infusion center. Please note that not all people who are eligible will receive the medication since it is in limited supply.      Are you interested in being considered for this medication?  No.   Does the patient fit the criteria: No    If patient qualifies based on above criteria:  \"You will be contacted if you are selected to receive this treatment in the next 1-2 business days.   This is time sensitive and if you are not selected in the next 1-2 business days, you will not receive the medication.  If you do not receive a call to schedule, you have not been selected.\"      Review information with Patient    Your result was positive. This means you have COVID-19 (coronavirus).  We have sent you a letter that reviews the information that I'll be reviewing with you now.    How can I protect others?    If you have symptoms: stay home and away from others (self-isolate) until:    You've had no fever--and no medicine that reduces fever--for 1 full day (24 hours). And       Your other symptoms have gotten better. For example, your cough or breathing has improved. And     At least 10 days have passed since your symptoms started. (If you've been told by a doctor that you have a weak immune system, wait 20 days.)     If you don't have symptoms: Stay home and away from others (self-isolate) until at least 10 days have passed since your first positive COVID-19 test. (Date test collected)    During this time:    Stay in your own room, including for meals. Use your own bathroom if you can.    Stay away from others in your home. No hugging, kissing or shaking hands. No visitors.     Don't go to work, school or anywhere else.     Clean  high touch  surfaces often (doorknobs, counters, handles, etc.). Use a household cleaning spray or wipes. You'll find " a full list on the EPA website at www.epa.gov/pesticide-registration/list-n-disinfectants-use-against-sars-cov-2.     Cover your mouth and nose with a mask, tissue or other face covering to avoid spreading germs.    Wash your hands and face often with soap and water.    Make a list of people you have been in close contact with recently, even if either of you wore a face covering.   ; Start your list from 2 days before you became ill or had a positive test.  ; Include anyone that was within 6 feet of you for a cumulative total of 15 minutes or more in 24 hours. (Example: if you sat next to Srinath for 5 minutes in the morning and 10 minutes in the afternoon, then you were in close contact for 15 minutes total that day. Srinath would be added to your list.)    A public health worker will call or text you. It is important that you answer. They will ask you questions about possible exposures to COVID-19, such as people you have been in direct contact with and places you have visited.    Tell the people on your list that you have COVID-19; they should stay away from others for 14 days starting from the last time they were in contact with you (unless you are told something different from a public health worker).     Caregivers in these groups are at risk for severe illness due to COVID-19:  o People 65 years and older  o People who live in a nursing home or long-term care facility  o People with chronic disease (lung, heart, cancer, diabetes, kidney, liver, immunologic)  o People who have a weakened immune system, including those who:  - Are in cancer treatment  - Take medicine that weakens the immune system, such as corticosteroids  - Had a bone marrow or organ transplant  - Have an immune deficiency  - Have poorly controlled HIV or AIDS  - Are obese (body mass index of 40 or higher)  - Smoke regularly    Caregivers should wear gloves while washing dishes, handling laundry and cleaning bedrooms and bathrooms.    Wash and dry  laundry with special caution. Don't shake dirty laundry, and use the warmest water setting you can.    If you have a weakened immune system, ask your doctor about other actions you should take.    For more tips, go to www.cdc.gov/coronavirus/2019-ncov/downloads/10Things.pdf.    You should not go back to work until you meet the guidelines above for ending your home isolation. You don't need to be retested for COVID-19 before going back to work--studies show that you won't spread the virus if it's been at least 10 days since your symptoms started (or 20 days, if you have a weak immune system).    Employers: This document serves as formal notice of your employee's medical guidelines for going back to work. They must meet the above guidelines before going back to work in person.    How can I take care of myself?    1. Get lots of rest. Drink extra fluids (unless a doctor has told you not to).    2. Take Tylenol (acetaminophen) for fever or pain. If you have liver or kidney problems, ask your family doctor if it's okay to take Tylenol.     Take either:     650 mg (two 325 mg pills) every 4 to 6 hours, or     1,000 mg (two 500 mg pills) every 8 hours as needed.     Note: Don't take more than 3,000 mg in one day. Acetaminophen is found in many medicines (both prescribed and over-the-counter medicines). Read all labels to be sure you don't take too much.    For children, check the Tylenol bottle for the right dose (based on their age or weight).    3. If you have other health problems (like cancer, heart failure, an organ transplant or severe kidney disease): Call your specialty clinic if you don't feel better in the next 2 days.    4. Know when to call 911: Emergency warning signs include:    Trouble breathing or shortness of breath    Pain or pressure in the chest that doesn't go away    Feeling confused like you haven't felt before, or not being able to wake up    Bluish-colored lips or face    5. Sign up for [facility]  [place]eah. We know it's scary to hear that you have COVID-19. We want to track your symptoms to make sure you're okay over the next 2 weeks. Please look for an email from Kati [employer]--this is a free, online program that we'll use to keep in touch. To sign up, follow the link in the email. Learn more at [email or web address].    Where can I get more information?    [facility]: [email or web address]    Coronavirus Basics: www.health.[email or web address].[place]./diseases/coronavirus/basics.html    What to Do If You're Sick: www.cdc.gov/coronavirus/2019-ncov/about/steps-when-sick.html    Ending Home Isolation: www.cdc.gov/coronavirus/2019-ncov/hcp/disposition-in-home-patients.html     Caring for Someone with COVID-19: www.cdc.gov/coronavirus/2019-ncov/if-you-are-sick/care-for-someone.html     [facility] clinical trials (COVID-19 research studies): clinicalaffairs.[facility]-clinical-trials     A Positive COVID-19 letter will be sent via [employer] or the mail. (Exception, no letters sent to Presurgerical/Preprocedure Patients)    Patient declines full quarantine instructions as  is covid19 positive and has been instructed.  Anne Galeana LPN

## 2022-03-01 ENCOUNTER — HOSPITAL ENCOUNTER (EMERGENCY)
Facility: HOSPITAL | Age: 37
Discharge: HOME | End: 2022-03-02
Attending: EMERGENCY MEDICINE | Admitting: EMERGENCY MEDICINE
Payer: COMMERCIAL

## 2022-03-01 DIAGNOSIS — T78.40XA ALLERGIC REACTION, INITIAL ENCOUNTER: ICD-10-CM

## 2022-03-01 LAB
HCG UR QL: NEGATIVE
INTERNAL QC OK POCT: NORMAL
POCT KIT EXPIRATION DATE: NORMAL
POCT KIT LOT NUMBER: NORMAL

## 2022-03-01 PROCEDURE — 250N000013 HC RX MED GY IP 250 OP 250 PS 637: Performed by: PHYSICIAN ASSISTANT

## 2022-03-01 PROCEDURE — 93005 ELECTROCARDIOGRAM TRACING: CPT | Performed by: PHYSICIAN ASSISTANT

## 2022-03-01 PROCEDURE — 99285 EMERGENCY DEPT VISIT HI MDM: CPT | Mod: 25

## 2022-03-01 PROCEDURE — 81025 URINE PREGNANCY TEST: CPT | Performed by: PHYSICIAN ASSISTANT

## 2022-03-01 RX ORDER — DIPHENHYDRAMINE HCL 50 MG
50 CAPSULE ORAL ONCE
Status: COMPLETED | OUTPATIENT
Start: 2022-03-01 | End: 2022-03-01

## 2022-03-01 RX ADMIN — DIPHENHYDRAMINE HCL 50 MG: 50 CAPSULE ORAL at 23:31

## 2022-03-01 ASSESSMENT — ENCOUNTER SYMPTOMS
COUGH: 0
DIARRHEA: 0
HEADACHES: 0
DYSURIA: 0
MYALGIAS: 0
FREQUENCY: 0
ABDOMINAL PAIN: 0
CHILLS: 0
SORE THROAT: 0
FEVER: 0
SHORTNESS OF BREATH: 0
NERVOUS/ANXIOUS: 0
HEMATURIA: 0
VOMITING: 0
LIGHT-HEADEDNESS: 1
NAUSEA: 0

## 2022-03-02 ENCOUNTER — APPOINTMENT (OUTPATIENT)
Dept: RADIOLOGY | Facility: HOSPITAL | Age: 37
End: 2022-03-02
Payer: COMMERCIAL

## 2022-03-02 VITALS
HEART RATE: 72 BPM | TEMPERATURE: 97.7 F | WEIGHT: 163 LBS | SYSTOLIC BLOOD PRESSURE: 97 MMHG | BODY MASS INDEX: 27.98 KG/M2 | OXYGEN SATURATION: 98 % | DIASTOLIC BLOOD PRESSURE: 64 MMHG | RESPIRATION RATE: 25 BRPM

## 2022-03-02 LAB
ATRIAL RATE - MUSE: 74 BPM
DIASTOLIC BLOOD PRESSURE - MUSE: NORMAL MMHG
INTERPRETATION ECG - MUSE: NORMAL
P AXIS - MUSE: 65 DEGREES
PR INTERVAL - MUSE: 142 MS
QRS DURATION - MUSE: 80 MS
QT - MUSE: 376 MS
QTC - MUSE: 417 MS
R AXIS - MUSE: 55 DEGREES
SYSTOLIC BLOOD PRESSURE - MUSE: NORMAL MMHG
T AXIS - MUSE: 54 DEGREES
VENTRICULAR RATE- MUSE: 74 BPM

## 2022-03-02 PROCEDURE — 250N000013 HC RX MED GY IP 250 OP 250 PS 637: Performed by: PHYSICIAN ASSISTANT

## 2022-03-02 PROCEDURE — 250N000009 HC RX 250: Performed by: PHYSICIAN ASSISTANT

## 2022-03-02 PROCEDURE — 71046 X-RAY EXAM CHEST 2 VIEWS: CPT

## 2022-03-02 PROCEDURE — 250N000012 HC RX MED GY IP 250 OP 636 PS 637: Performed by: PHYSICIAN ASSISTANT

## 2022-03-02 PROCEDURE — 70360 X-RAY EXAM OF NECK: CPT

## 2022-03-02 RX ORDER — EPINEPHRINE 0.3 MG/.3ML
0.3 INJECTION SUBCUTANEOUS
Qty: 1 EACH | Refills: 0 | Status: SHIPPED | OUTPATIENT
Start: 2022-03-02

## 2022-03-02 RX ORDER — PREDNISONE 20 MG/1
TABLET ORAL
Qty: 10 TABLET | Refills: 0 | Status: SHIPPED | OUTPATIENT
Start: 2022-03-02

## 2022-03-02 RX ORDER — PREDNISONE 20 MG/1
40 TABLET ORAL ONCE
Status: COMPLETED | OUTPATIENT
Start: 2022-03-02 | End: 2022-03-02

## 2022-03-02 RX ADMIN — PREDNISONE 40 MG: 20 TABLET ORAL at 01:07

## 2022-03-02 RX ADMIN — LIDOCAINE HYDROCHLORIDE 30 ML: 20 SOLUTION ORAL; TOPICAL at 01:06

## 2022-03-02 NOTE — ED PROVIDER NOTES
Emergency Department Midlevel Supervisory Note     I personally saw the patient and performed a substantive portion of the visit including all aspects of the medical decision making.    ED Course:  12:13 AM Kaitlin Fink PA-C staffed patient with me. I agree with their assessment and plan of management, and I will see the patient.  0030 AM  I met with the patient to introduce myself, gather additional history, perform my initial exam, and discuss the plan.     Brief HPI:     Clifford Ward is a 36 year old female who presents for evaluation of shortness of breath, uncomfortable feeling in her throat, and facial rash. Patient reports pressure in her throat onset yesterday evening. Notes associated pain with swallowing. This evening she notes having rash that began around her bilateral eyes that is painful and associated with swelling.     I, Ministerio Childress, am serving as a scribe to document services personally performed by Yakelin Saleem M.D., based on my observations and the provider's statements to me.   I, Yakelin Saleem M.D., attest that Ministerio Childress was acting in a scribe capacity, has observed my performance of the services and has documented them in accordance with my direction.    Brief Physical Exam:  Constitutional:  Alert, in no acute distress  EYES: Conjunctivae clear  HENT:  Atraumatic, normocephalic  Respiratory:  Respirations even, unlabored, in no acute respiratory distress.  Tolerating secretions without difficulty.  Cardiovascular:  Regular rate and rhythm, good peripheral perfusion  GI: Soft, nondistended, nontender, no palpable masses, no rebound, no guarding   Musculoskeletal:  No edema. No cyanosis. Range of motion major extremities intact.    Integument:  Rash noted to bilateral upper cheeks, erythematous, mildly tender to palpation with some slight erythema and mild redness to the right upper eyelid.  No conjunctival involvement.  Neurologic:  Alert & oriented, no focal deficits  noted  Psych: Normal mood and affect     MDM:      Patient is feeling better and does have some improvement to her redness on her bilateral upper cheeks with medications given in the emergency department.  Chest x-ray without acute process noted.  X-ray soft tissue neck shows no obvious epiglottal swelling but no prevertebral swelling noted either.  Will discharge patient home at this time with medications for allergic reaction.  Follow-up with her primary care doctor.      ED Course as of 03/02/22 0100   Tue Mar 01, 2022   7415 Clifford is a 36 year old female with a relevant PMH of hypothyroidism who presents to the ED for evaluation of difficulty taking a deep breath, lightheadedness, an uncomfortable feeling in her lower throat, and a rash on her face.    My exam is notable for nl vital signs in a nontoxic appearing woman. Taking intentional deep breaths.  Erythematous papules to cheeks bilaterally with associated TTP.  Right upper eyelid with mild edema and erythema.  Conjunctive are clear.  EOMs intact.  No stridor.  No evidence of respiratory distress.  Clear lung sounds.  Swallowing secretions without difficulty.       2316 I considered a broad differential including allergic reaction, herpes zoster, angioedema, epiglottitis, retropharyngeal or peritonsillar abscess, esophageal foreign body, esophagitis, cardiac arrhythmia, and others   2335 Given facial rashes bilateral lower suspicion of herpes zoster.  Clinically no evidence of angioedema.  No fever, toxic appearance, stridor, or difficulty swallowing secretions.  Low suspicion of epiglottitis.  No clinical evidence of peritonsillar abscess.  EKG reveals sinus rhythm with sinus rhythm.  No acute ST elevation or depression.  No pathologic arrhythmia.  When compared to prior EKG from 2016 no significant change was found.  She is describing her shortness of breath more is difficulty taking a deep breath.  Clinically she does not appear short of breath.  She  is oxygenating without difficulty and is not tachypneic.  She had clear lung sounds.  Nothing to suggest reactive or obstructive airway disease.  Low risk for severe anemia and vital signs within normal limits.  Low suspicion of significant metabolic derangement such as DKA crating tachypnea or feeling of shortness of breath.  Lower suspicion of esophageal foreign body but considered given patient reported discomfort with swallowing or a scratch in her esophagus.  GI cocktail ordered.  Also ordered for steroid to help with rash.  I am most suspicious for an allergic reaction.  Chest x-ray and x-ray soft tissue neck pending.  Patient has been vitally stable in the 2-hour she has been in the emergency department.  She did have some minimal improvement in rash on the right side of her face 1/2-hour into receiving Benadryl.  Discussed with Dr. Green who will follow up on results of imaging.  Likely appropriate for discharge with symptomatic measures for allergic reaction.       1. Allergic reaction, initial encounter        Labs and Imaging:  Results for orders placed or performed during the hospital encounter of 03/01/22   Chest XR,  PA & LAT    Impression    IMPRESSION: Negative chest.   Neck soft tissue XR    Impression    IMPRESSION:   Portions of the epiglottis abut the preepiglottic soft tissues limiting assessment. On limited evaluation, the epiglottis appears normal in size.    No prevertebral soft tissue swelling.    No radiographic acute findings. If there is persistent clinical concern, CT soft tissue neck with IV contrast can be obtained as clinically warranted.   HCG qualitative urine POCT   Result Value Ref Range    HCG Qual Urine Negative Negative    Internal QC Check POCT Valid Valid    POCT Kit Lot Number [id number]     POCT Kit Expiration Date 2023-03-31      I have reviewed the relevant laboratory and radiology studies    Procedures:  I was present for the key portions of this procedure:  none    Yakelin Saleem M.D.  [facility] EMERGENCY DEPARTMENT  [address] [place] [id number] [phone]       Dasia Saleem MD  03/02/22 0102

## 2022-03-02 NOTE — DISCHARGE INSTRUCTIONS
You were seen in the emergency department for an allergic reaction.     Take care of yourself at home.  -take famotidine 20 mg two times a day for itching or upset stomach  - take prednisone 40 mg in the morning with a meal for the next 4 days starting tomorrow. This can make you feel more awake than normal and can cause an upset stomach so please take as directed  - benedryl 50 mg every 8 hours for itching, allergic reaction symptoms, or skin changes    For pain or fever you may take:  - Ibuprofen 600 mg every 6 hours. Max 3200 mgin 24 hours  - Please take this medication with food as it can cause an upset stomach  - Please do not take this medication if you have a history of a GI bleed  - Tylenol 650 mg every 6 hours. Max 4000 mg in 24hours.   - Please do not take this medication with alcohol as it can cause problems with your liver.    If your symptoms return such as throwing up, shortness of breath, facial swelling or throat tightening, fainting or feeling lightheaded, chest pain, or diffuse hives please call 911 and return to the emergency department.    I have given you a prescription for an epipen. Please discuss with your primary care provider prior to filling it as your insurance may not cover it.    Please follow up with primary care provider in the next few days to ensure your symptoms are improving.

## 2022-03-02 NOTE — ED PROVIDER NOTES
Emergency Department Encounter   NAME: Clifford Ward ; AGE: 36 year old female ; YOB: 1985 ; MRN: [id number] ; EVALUATION DATE & TIME: No admission date for patient encounter. ; PCP: No Ref-Primary, Physician   ED PROVIDER: Kaitlin Fink PA-C    Chief Complaint   Patient presents with     Dysphagia     Shortness of Breath     Facial Swelling       Medical Decision Making & Final Diagnosis     1. Allergic reaction, initial encounter         ED Course as of 03/02/22 0020   Tue Mar 01, 2022   2314 Clifford is a 36 year old female with a relevant PMH of hypothyroidism who presents to the ED for evaluation of difficulty taking a deep breath, lightheadedness, an uncomfortable feeling in her lower throat, and a rash on her face.    My exam is notable for nl vital signs in a nontoxic appearing woman. Taking intentional deep breaths.  Erythematous papules to cheeks bilaterally with associated TTP.  Right upper eyelid with mild edema and erythema.  Conjunctive are clear.  EOMs intact.  No stridor.  No evidence of respiratory distress.  Clear lung sounds.  Swallowing secretions without difficulty.       2316 I considered a broad differential including allergic reaction, herpes zoster, angioedema, epiglottitis, retropharyngeal or peritonsillar abscess, esophageal foreign body, esophagitis, cardiac arrhythmia, and others   2335 Given facial rashes bilateral lower suspicion of herpes zoster.  Clinically no evidence of angioedema.  No fever, toxic appearance, stridor, or difficulty swallowing secretions.  Low suspicion of epiglottitis.  No clinical evidence of peritonsillar abscess.  EKG reveals sinus rhythm with sinus rhythm.  No acute ST elevation or depression.  No pathologic arrhythmia.  When compared to prior EKG from 2016 no significant change was found.  She is describing her shortness of breath more is difficulty taking a deep breath.  Clinically she does not appear short of breath.  She is  oxygenating without difficulty and is not tachypneic.  She had clear lung sounds.  Nothing to suggest reactive or obstructive airway disease.  Low risk for severe anemia and vital signs within normal limits.  Low suspicion of significant metabolic derangement such as DKA crating tachypnea or feeling of shortness of breath.  Lower suspicion of esophageal foreign body but considered given patient reported discomfort with swallowing or a scratch in her esophagus.  GI cocktail ordered.  Also ordered for steroid to help with rash.  I am most suspicious for an allergic reaction.  Chest x-ray and x-ray soft tissue neck pending.  Patient has been vitally stable in the 2-hour she has been in the emergency department.  She did have some minimal improvement in rash on the right side of her face 1/2-hour into receiving Benadryl.  Discussed with Dr. Green who will follow up on results of imaging.  Likely appropriate for discharge with symptomatic measures for allergic reaction.            ED Course   10:46 PM I met and introduced myself to the patient. I gathered initial history and performed my physical exam. We discussed plan for initial workup.   12:07 AM Rechecked and discussed findings with patient. Discussed my sign out  I did see the patient while wearing full COVID-compliant PPE.  12:18 AM Signed out to Dr. Saleem    MEDICATIONS GIVEN IN THE EMERGENCY:   Medications   lidocaine (viscous) (XYLOCAINE) 2 % 15 mL, alum & mag hydroxide-simethicone (MAALOX) 15 mL GI Cocktail (has no administration in time range)   predniSONE (DELTASONE) tablet 40 mg (has no administration in time range)   diphenhydrAMINE (BENADRYL) capsule 50 mg (50 mg Oral Given 3/1/22 0442)      NEW PRESCRIPTIONS STARTED AT TODAY'S ER VISIT:  New Prescriptions    EPINEPHRINE (ANY BX GENERIC EQUIV) 0.3 MG/0.3ML INJECTION 2-PACK    Inject 0.3 mLs (0.3 mg) into the muscle once as needed for anaphylaxis    PREDNISONE (DELTASONE) 20 MG TABLET    Take two  tablets (= 40mg) each day for 5 (five) days     =================================================================   History   Patient information was obtained from: patient and her    Use of Intrepreter: N/A - declined interpretor. Her  is interpreting.    Clifford Ward is a 36 year old female with a relevant PMH of hypothyroidism who presents to the ED for evaluation of shortness of breath, an uncomfortable feeling in her throat, and a rash on her face.   Patient first noticed a pressure in her throat right above her clavicles with onset yesterday evening.  This area is painful when she swallows but there is no pain when she is not eating or drinking something.  Around noon today she began to feel lightheaded and felt like she was having a hard time inhaling deep breath. She is feeling lightheaded. This evening she noted a rash that started around bilateral eyes that is painful and associated with swelling.  Denies itching.  Denies history of prior reaction.  8 tilapia last night and tonight.  Denies any other possible allergic or irritating contact, medication change, change in laundry detergent, clothing, history of lung disease.  Notes prior history when mixing fish and dairy of having her legs become itchy but it resolved without intervention after several hours.  H/o anxiety prior to her thyroid disease being treated but none since then. Last menstrual period  2/11/22 but is sexually active and does not take birth control.  No history of VTE, recent surgery immobilization, or chest pain. Denies other complaints. Denies chance of eating fish bone  ______________________________________________________________________  Past Medical History:   Diagnosis Date     Disease of thyroid gland      Migraine         No past surgical history on file.    Family History   Problem Relation Age of Onset     Diabetes Mother      Hypertension Mother      Breast Cancer Paternal Aunt 40.00       Social  History     Tobacco Use     Smoking status: Never Smoker     Smokeless tobacco: Never Used   Substance Use Topics     Alcohol use: No     Drug use: No       REVIEW OF SYSTEMS:    Review of Systems   Constitutional: Negative for chills and fever.   HENT: Negative for sore throat.    Eyes: Negative for visual disturbance.   Respiratory: Negative for cough and shortness of breath.         Difficulty getting a full breath   Cardiovascular: Negative for chest pain.   Gastrointestinal: Negative for abdominal pain, diarrhea, nausea and vomiting.   Genitourinary: Negative for dysuria, frequency, hematuria and urgency.   Musculoskeletal: Negative for myalgias.   Skin: Positive for rash.   Neurological: Positive for light-headedness. Negative for headaches.   Psychiatric/Behavioral: The patient is not nervous/anxious.    All other systems reviewed and are negative.        Physical Exam   /67   Pulse 70   Temp 97.7  F (36.5  C) (Oral)   Resp 14   Wt 73.9 kg (163 lb)   LMP 02/11/2022 (Exact Date)   SpO2 100%   Breastfeeding No   BMI 27.98 kg/m      Physical Exam  Constitutional:       General: She is not in acute distress.     Appearance: Normal appearance.   HENT:      Head: Normocephalic.      Comments: Swallowing secretions without difficulty. Posterior oropharynx without edema, erythema, tonsillar hypertrophy. Intact ROM to neck. No perioral edema or erythema. No stridor.   Eyes:      Extraocular Movements: Extraocular movements intact.      Conjunctiva/sclera: Conjunctivae normal.      Comments: Conjunctivae clear. R upper eyelid edema. Bilateral erythematous papules that are tender to bilateral cheeks   Cardiovascular:      Rate and Rhythm: Normal rate and regular rhythm.      Heart sounds: Normal heart sounds.   Pulmonary:      Effort: Pulmonary effort is normal. No respiratory distress.      Breath sounds: Normal breath sounds. No wheezing, rhonchi or rales.      Comments: Pt taking intentional deep  breaths.  Abdominal:      General: There is no distension.      Palpations: Abdomen is soft.      Tenderness: There is no abdominal tenderness. There is no guarding or rebound.   Musculoskeletal:         General: No swelling or deformity. Normal range of motion.      Cervical back: Normal range of motion.      Right lower leg: No edema.      Left lower leg: No edema.   Skin:     General: Skin is warm.   Neurological:      General: No focal deficit present.      Mental Status: She is alert.   Psychiatric:         Mood and Affect: Mood normal.         Lab Work (Reviewed and Interpreted):   Labs Ordered and Resulted from Time of ED Arrival to Time of ED Departure   HCG QUALITATIVE URINE POCT - Normal       Result Value    HCG Qual Urine Negative      Internal QC Check POCT Valid      POCT Kit Lot Number [id number]      POCT Kit Expiration Date 2023-03-31         Imaging (Reviewed and Interpreted):   Chest XR,  PA & LAT    (Results Pending)   Neck soft tissue XR    (Results Pending)       EKG (Reviewed and Interpreted):   EKG results reviewed and interpreted by Dr. Clay ED MD.     Performed at: 01-Mar-2022 23:26  Impression: Sinus rhythm with sinus arrhythmia, Low voltage QRS, Borderline ECG, No STEMI  Rate: 74 BPM  Rhythm: Sinus rhythm with sinus arrhythmia  QRS Interval: 80 ms  QTc Interval: 376/417 ms  Comparison: When compared with ECG of 26-Jul-2016 23:36, No significant change was found.   I have independently reviewed and interpreted the EKG(s) documented above.       Kaitlin Fink PA-C   Emergency Medicine   [facility] EMERGENCY DEPARTMENT  [facility] [facility] [id number] [phone]  Dept: [phone]        Kaitlin Fink PA-C  03/02/22 0021

## 2022-03-02 NOTE — ED TRIAGE NOTES
Pt had tilapia last evening for dinner and had tilapia this evening, as well. It is unclear exactly if her symptoms started before or after dinner yesterday, but Clifford states that starting at some point yesterday and worsening after dinner this evening, she felt difficulty and pain with swallowing, noticed some eye swelling, and then this evening is feeling short of breath.

## 2022-07-17 ENCOUNTER — HEALTH MAINTENANCE LETTER (OUTPATIENT)
Age: 37
End: 2022-07-17

## 2022-09-25 ENCOUNTER — HEALTH MAINTENANCE LETTER (OUTPATIENT)
Age: 37
End: 2022-09-25

## 2023-08-05 ENCOUNTER — HEALTH MAINTENANCE LETTER (OUTPATIENT)
Age: 38
End: 2023-08-05

## 2024-03-02 ENCOUNTER — HEALTH MAINTENANCE LETTER (OUTPATIENT)
Age: 39
End: 2024-03-02

## 2024-09-28 ENCOUNTER — HEALTH MAINTENANCE LETTER (OUTPATIENT)
Age: 39
End: 2024-09-28